# Patient Record
Sex: MALE | Race: WHITE | URBAN - METROPOLITAN AREA
[De-identification: names, ages, dates, MRNs, and addresses within clinical notes are randomized per-mention and may not be internally consistent; named-entity substitution may affect disease eponyms.]

---

## 2017-05-10 NOTE — PATIENT DISCUSSION
The patient is at risk for a choroidal neovascular membrane. NO CNV SEEN TODAY. Dry ARMD is responsible for some decrease in vision.

## 2017-05-10 NOTE — PATIENT DISCUSSION
HAS BEEN USING PRN - EXPLAINED TO PT SHE NEEDS TO USE IT FOR AT LEAST 2 WKS CONTINUOUSLY TO BE EFFECTIVE.

## 2020-08-27 ENCOUNTER — IMPORTED ENCOUNTER (OUTPATIENT)
Dept: URBAN - METROPOLITAN AREA CLINIC 50 | Facility: CLINIC | Age: 75
End: 2020-08-27

## 2020-08-31 ENCOUNTER — IMPORTED ENCOUNTER (OUTPATIENT)
Dept: URBAN - METROPOLITAN AREA CLINIC 50 | Facility: CLINIC | Age: 75
End: 2020-08-31

## 2020-09-01 ENCOUNTER — IMPORTED ENCOUNTER (OUTPATIENT)
Dept: URBAN - METROPOLITAN AREA CLINIC 50 | Facility: CLINIC | Age: 75
End: 2020-09-01

## 2020-09-14 ENCOUNTER — IMPORTED ENCOUNTER (OUTPATIENT)
Dept: URBAN - METROPOLITAN AREA CLINIC 50 | Facility: CLINIC | Age: 75
End: 2020-09-14

## 2020-09-23 ENCOUNTER — IMPORTED ENCOUNTER (OUTPATIENT)
Dept: URBAN - METROPOLITAN AREA CLINIC 50 | Facility: CLINIC | Age: 75
End: 2020-09-23

## 2020-09-23 PROBLEM — Z96.1: Noted: 2020-09-23

## 2020-09-23 PROBLEM — Z96.1: Noted: 2020-10-07

## 2020-09-23 NOTE — PATIENT DISCUSSION
"""S/P IOL OD: Cinthia LND300 24.5 @ 015Âº +ORA/Femto/Arcs +Omidria. Continue post operative instructions and drops per schedule.  """

## 2020-09-28 ENCOUNTER — IMPORTED ENCOUNTER (OUTPATIENT)
Dept: URBAN - METROPOLITAN AREA CLINIC 50 | Facility: CLINIC | Age: 75
End: 2020-09-28

## 2020-10-07 ENCOUNTER — IMPORTED ENCOUNTER (OUTPATIENT)
Dept: URBAN - METROPOLITAN AREA CLINIC 50 | Facility: CLINIC | Age: 75
End: 2020-10-07

## 2020-10-07 PROBLEM — Z96.1: Noted: 2020-10-07

## 2020-10-07 PROBLEM — Z96.1: Noted: 2020-09-23

## 2020-10-07 NOTE — PATIENT DISCUSSION
"""S/P IOL OS: Tecnis ZCB00 25 +Femto/Arcs +Omidria. Continue post operative instructions and drops per schedule.  """

## 2020-10-12 ENCOUNTER — IMPORTED ENCOUNTER (OUTPATIENT)
Dept: URBAN - METROPOLITAN AREA CLINIC 50 | Facility: CLINIC | Age: 75
End: 2020-10-12

## 2020-11-02 ENCOUNTER — IMPORTED ENCOUNTER (OUTPATIENT)
Dept: URBAN - METROPOLITAN AREA CLINIC 50 | Facility: CLINIC | Age: 75
End: 2020-11-02

## 2021-02-08 ENCOUNTER — IMPORTED ENCOUNTER (OUTPATIENT)
Dept: URBAN - METROPOLITAN AREA CLINIC 50 | Facility: CLINIC | Age: 76
End: 2021-02-08

## 2021-04-17 ASSESSMENT — VISUAL ACUITY
OD_SC: 20/20-1
OD_CC: J2
OD_SC: 20/20+1
OS_BAT: 20/30
OD_CC: J1+/-
OD_BAT: 20/200
OS_SC: 20/20-1
OS_OTHER: 20/30. 20/50.
OS_OTHER: 20/40. 20/40.
OD_SC: 20/20-1
OD_SC: 20/60
OS_BAT: 20/40
OD_OTHER: 20/200.
OS_SC: 20/20
OD_CC: J1+
OS_CC: J2
OS_CC: 20/40
OD_OTHER: 20/30. 20/50.
OD_BAT: 20/20
OD_CC: 20/80
OS_SC: 20/30-1
OS_CC: J1+
OD_BAT: 20/200
OS_CC: 20/25
OS_OTHER: 20/40. 20/60.
OS_PH: 20/20-
OS_BAT: 20/40
OS_BAT: 20/20
OD_BAT: 20/30
OD_OTHER: 20/200.
OS_CC: J1+/-
OS_CC: 20/80
OD_SC: 20/15-2
OD_PH: 20/40
OS_BAT: 20/40
OD_OTHER: 20/20. 20/20.
OS_OTHER: 20/20. 20/20.
OS_SC: 20/20
OS_OTHER: 20/40. 20/40.
OD_CC: 20/100

## 2021-04-17 ASSESSMENT — TONOMETRY
OD_IOP_MMHG: 24
OS_IOP_MMHG: 13
OS_IOP_MMHG: 16
OS_IOP_MMHG: 15
OS_IOP_MMHG: 14
OD_IOP_MMHG: 12
OD_IOP_MMHG: 13
OD_IOP_MMHG: 12
OS_IOP_MMHG: 12
OD_IOP_MMHG: 12
OD_IOP_MMHG: 13
OS_IOP_MMHG: 20
OS_IOP_MMHG: 13
OD_IOP_MMHG: 15

## 2022-02-07 ENCOUNTER — COMPREHENSIVE EXAM (OUTPATIENT)
Dept: URBAN - METROPOLITAN AREA CLINIC 53 | Facility: CLINIC | Age: 77
End: 2022-02-07

## 2022-02-07 DIAGNOSIS — H26.493: ICD-10-CM

## 2022-02-07 DIAGNOSIS — H43.813: ICD-10-CM

## 2022-02-07 PROCEDURE — 92014 COMPRE OPH EXAM EST PT 1/>: CPT

## 2022-02-07 ASSESSMENT — TONOMETRY
OS_IOP_MMHG: 19
OD_IOP_MMHG: 16

## 2022-02-07 ASSESSMENT — VISUAL ACUITY
OU_CC: J1+
OD_GLARE: 20/20
OS_GLARE: 20/20
OS_GLARE: 20/20
OD_GLARE: 20/20
OD_SC: 20/20
OS_SC: 20/20

## 2024-02-29 ENCOUNTER — COMPREHENSIVE EXAM (OUTPATIENT)
Dept: URBAN - METROPOLITAN AREA CLINIC 52 | Facility: CLINIC | Age: 79
End: 2024-02-29

## 2024-02-29 DIAGNOSIS — H35.371: ICD-10-CM

## 2024-02-29 DIAGNOSIS — H02.834: ICD-10-CM

## 2024-02-29 DIAGNOSIS — H52.4: ICD-10-CM

## 2024-02-29 DIAGNOSIS — H02.831: ICD-10-CM

## 2024-02-29 PROCEDURE — 92015 DETERMINE REFRACTIVE STATE: CPT

## 2024-02-29 PROCEDURE — 92014 COMPRE OPH EXAM EST PT 1/>: CPT

## 2024-02-29 ASSESSMENT — VISUAL ACUITY
OU_SC: 20/20
OD_GLARE: 20/30
OD_SC: 20/25
OS_GLARE: 20/30
OD_GLARE: 20/40
OS_SC: 20/20
OS_GLARE: 20/30

## 2024-02-29 ASSESSMENT — KERATOMETRY
OS_AXISANGLE2_DEGREES: 84
OD_K1POWER_DIOPTERS: 44.00
OD_AXISANGLE_DEGREES: 0
OD_AXISANGLE2_DEGREES: 90
OS_K1POWER_DIOPTERS: 43.50
OS_K2POWER_DIOPTERS: 44.25
OS_AXISANGLE_DEGREES: 174
OD_K2POWER_DIOPTERS: 44.00

## 2024-02-29 ASSESSMENT — TONOMETRY
OD_IOP_MMHG: 13
OS_IOP_MMHG: 14

## 2024-03-01 ENCOUNTER — CONSULTATION/EVALUATION (OUTPATIENT)
Dept: URBAN - METROPOLITAN AREA CLINIC 52 | Facility: CLINIC | Age: 79
End: 2024-03-01

## 2024-03-01 DIAGNOSIS — H02.423: ICD-10-CM

## 2024-03-01 DIAGNOSIS — H02.834: ICD-10-CM

## 2024-03-01 DIAGNOSIS — H02.831: ICD-10-CM

## 2024-03-01 PROCEDURE — 92285 EXTERNAL OCULAR PHOTOGRAPHY: CPT

## 2024-03-01 PROCEDURE — 99213 OFFICE O/P EST LOW 20 MIN: CPT

## 2024-03-01 ASSESSMENT — KERATOMETRY
OS_AXISANGLE_DEGREES: 174
OS_K1POWER_DIOPTERS: 43.50
OD_AXISANGLE_DEGREES: 0
OS_AXISANGLE2_DEGREES: 84
OD_K2POWER_DIOPTERS: 44.00
OS_K2POWER_DIOPTERS: 44.25
OD_AXISANGLE2_DEGREES: 90
OD_K1POWER_DIOPTERS: 44.00

## 2024-03-01 ASSESSMENT — VISUAL ACUITY
OD_SC: 20/25
OS_SC: 20/20

## 2024-03-01 ASSESSMENT — TONOMETRY
OD_IOP_MMHG: 13
OS_IOP_MMHG: 14

## 2024-03-15 ENCOUNTER — DIAGNOSTICS ONLY (OUTPATIENT)
Dept: URBAN - METROPOLITAN AREA CLINIC 52 | Facility: CLINIC | Age: 79
End: 2024-03-15

## 2024-03-15 DIAGNOSIS — H02.831: ICD-10-CM

## 2024-03-15 PROCEDURE — 92285 EXTERNAL OCULAR PHOTOGRAPHY: CPT

## 2024-03-15 PROCEDURE — 92082 INTERMEDIATE VISUAL FIELD XM: CPT

## 2024-03-15 ASSESSMENT — KERATOMETRY
OS_K1POWER_DIOPTERS: 43.50
OS_AXISANGLE_DEGREES: 174
OD_AXISANGLE_DEGREES: 0
OD_K2POWER_DIOPTERS: 44.00
OD_AXISANGLE2_DEGREES: 90
OD_K1POWER_DIOPTERS: 44.00
OS_AXISANGLE2_DEGREES: 84
OS_K2POWER_DIOPTERS: 44.25

## 2024-06-03 ENCOUNTER — PRE-OP/H&P (OUTPATIENT)
Dept: URBAN - METROPOLITAN AREA CLINIC 49 | Facility: LOCATION | Age: 79
End: 2024-06-03

## 2024-06-03 DIAGNOSIS — H02.831: ICD-10-CM

## 2024-06-03 DIAGNOSIS — H02.834: ICD-10-CM

## 2024-06-03 PROCEDURE — PREOP PRE OP VISIT

## 2024-06-03 PROCEDURE — 92285 EXTERNAL OCULAR PHOTOGRAPHY: CPT

## 2024-06-03 ASSESSMENT — VISUAL ACUITY
OU_SC: 20/20
OS_SC: 20/20
OD_SC: 20/20

## 2024-06-03 ASSESSMENT — TONOMETRY
OS_IOP_MMHG: 10
OD_IOP_MMHG: 09

## 2024-06-11 ENCOUNTER — SURGERY/PROCEDURE (OUTPATIENT)
Dept: URBAN - METROPOLITAN AREA SURGERY 16 | Facility: SURGERY | Age: 79
End: 2024-06-11

## 2024-06-11 DIAGNOSIS — H02.834: ICD-10-CM

## 2024-06-11 DIAGNOSIS — H02.831: ICD-10-CM

## 2024-06-11 PROCEDURE — 1582350 UPPER BLEPH PER EYE FUNCTIONAL-BILATERAL

## 2024-06-19 ENCOUNTER — POST-OP (OUTPATIENT)
Dept: URBAN - METROPOLITAN AREA CLINIC 49 | Facility: LOCATION | Age: 79
End: 2024-06-19

## 2024-06-19 DIAGNOSIS — Z98.890: ICD-10-CM

## 2024-06-19 PROCEDURE — 99024 POSTOP FOLLOW-UP VISIT: CPT

## 2024-06-19 ASSESSMENT — VISUAL ACUITY
OS_SC: 20/20
OU_SC: 20/20
OD_SC: 20/20

## 2024-07-12 ENCOUNTER — POST-OP (OUTPATIENT)
Dept: URBAN - METROPOLITAN AREA CLINIC 52 | Facility: CLINIC | Age: 79
End: 2024-07-12

## 2024-07-12 DIAGNOSIS — Z98.890: ICD-10-CM

## 2024-07-12 PROCEDURE — 92285 EXTERNAL OCULAR PHOTOGRAPHY: CPT | Mod: NC

## 2024-07-12 PROCEDURE — 66999PO NON CO-MANAGED OTHER SURGERY PO

## 2024-07-12 ASSESSMENT — VISUAL ACUITY
OS_SC: 20/25-1
OD_PH: 20/20

## 2025-04-02 ENCOUNTER — COMPREHENSIVE EXAM (OUTPATIENT)
Age: 80
End: 2025-04-02

## 2025-04-02 DIAGNOSIS — H18.413: ICD-10-CM

## 2025-04-02 DIAGNOSIS — H52.4: ICD-10-CM

## 2025-04-02 DIAGNOSIS — Z98.890: ICD-10-CM

## 2025-04-02 DIAGNOSIS — H43.813: ICD-10-CM

## 2025-04-02 DIAGNOSIS — Z96.1: ICD-10-CM

## 2025-04-02 PROCEDURE — 92134 CPTRZ OPH DX IMG PST SGM RTA: CPT

## 2025-04-02 PROCEDURE — 99214 OFFICE O/P EST MOD 30 MIN: CPT
